# Patient Record
Sex: MALE | Race: WHITE | NOT HISPANIC OR LATINO | Employment: STUDENT | URBAN - METROPOLITAN AREA
[De-identification: names, ages, dates, MRNs, and addresses within clinical notes are randomized per-mention and may not be internally consistent; named-entity substitution may affect disease eponyms.]

---
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
## 2021-01-26 NOTE — PROGRESS NOTES
[facility] - PRIMARY CARE SKIN    CC : Oral Isotretinoin Initiation for Recalcitrant Acne  SUBJECTIVE:                                                    Shaun Moise is a 21 year old male who presents to clinic today for follow up of oral isotretinoin      Previous treatments  :  course of oral isotretinoin in 2017 ( this was effective )  , various topicals, oral antibiotics.    Month completed : 3  Dose of oral isotretinoin :  40 mg bid  Side effects : drynerss  Response to treatment : less breakouts on the face    iPLDEGE #: [id number]    Occupation :student    Refer to electronic medical record (EMR) for past medical history and medications.  ROS : 14 point review of systems was negative except the symptoms listed above in the HPI.    OBJECTIVE:                                                      GENERAL: healthy, alert and no distress  SKIN: Dixon Skin Type - II.  Face and Trunk were examined. The dermatoscope was used to help evaluate pigmented lesions.  Skin Pertinent Findings:    Face: no inflammatory papules   Chest: clear    Back: no inflammatory papules                           Hands : dry sin  Diagnostic Test Results:  No results found for this or any previous visit (from the past 24 hour(s)).    MDM : . Side effects of oral isotretinoin reviewed : dryness of the skin and mucous membranes, arthralgias, myalgias, mood changes. Discussed potential flare of the acne.      ASSESSMENT:                                                      Encounter Diagnoses   Name Primary?     Acne vulgaris      Long term use of isotretinoin      Comment : severe acne, recalcitrant to previous treatments. Oral isotretinoin to be started with monthly monitoring.        PLAN:                                                    Patient Instructions   Isotretinoin 40 mg orally two times per day  No lab work this month  Lab work in 4 weeks  Start regular moisturizer for the top of the hands, if not improving then I  will prescribe triamcinolone 0.1% cream       Literature provided: iPledge Program materials. The patient was thoroughly counseled about side effects, benefits, risks of isotretinoin and registered on the iPledge Program website.     Initial lab studies were ordered.    Oral isotretinoin is discussed fully with the patient .    It is a very effective drug to treat acne vulgaris but has many significant side effects. Chief among these are teratogenesis, hepatic injury, dyslipidemia and severe drying of the mucous membranes. All of these issues have been discussed in detail. Monthly blood tests to monitor lipids and liver functions will be necessary. Expect painful dryness and/or fissuring around the lips, eyes, and other moist areas of the body. Balms may be protective. Contact lenses may be too painful to wear temporarily while on this drug. Episodes of significant depression have been reported, including suicidal ideation and attempts in rare cases. It may also cause pseudotumor cerebri and hyperostosis. The patient will report any such changes in mood, depressive symptoms or suicidal thoughts, headaches, joint or bone pains.    Female patients MUST use two simultaneous methods of family planning. Accutane is Category X for pregnancy, meaning it will cause fetal teratogenic malformations, and pregnancy MUST be avoided while on this drug. For that reason, the patient is admonished to never share the medication.    The dose is 0.5-1 mg/kg in two divided doses for 15-20 weeks.    After discussion of these important issues, he indicates complete understanding of all of the above, and Does wish to proceed with accutane therapy. A signed consent was obtained. Discussed the importance of sticking with the program, not picking or excoriating.        Oral isotretinoin dosing:  Month #1 (prescribed on 11/30/2020) : 20 mg po bid = 1200 mg.  Month #2 (prescribed on 12/30/2020) : 40 mg po bid = 2400 mg.  Month #3 : 40 mg po bid  = 2400 mg.  Total dose taken : 5400 mg  Goal dose       PROCEDURES:                                                    None.    TT : 20 minutes.  CT : 15 minutes, with 50% of time spent reviewing lab work and counseling patient about side effects, benefits and risks of oral isotretinoin.

## 2021-01-27 ENCOUNTER — OFFICE VISIT (OUTPATIENT)
Dept: FAMILY MEDICINE | Facility: CLINIC | Age: 21
End: 2021-01-27
Payer: OTHER GOVERNMENT

## 2021-01-27 VITALS — DIASTOLIC BLOOD PRESSURE: 62 MMHG | SYSTOLIC BLOOD PRESSURE: 122 MMHG

## 2021-01-27 DIAGNOSIS — Z79.899 LONG TERM USE OF ISOTRETINOIN: ICD-10-CM

## 2021-01-27 DIAGNOSIS — L70.0 ACNE VULGARIS: ICD-10-CM

## 2021-01-27 PROCEDURE — 99213 OFFICE O/P EST LOW 20 MIN: CPT | Performed by: FAMILY MEDICINE

## 2021-01-27 RX ORDER — ISOTRETINOIN 40 MG/1
40 CAPSULE ORAL 2 TIMES DAILY
Qty: 60 CAPSULE | Refills: 0 | Status: SHIPPED | OUTPATIENT
Start: 2021-01-27 | End: 2021-02-24

## 2021-01-27 NOTE — LETTER
1/27/2021         RE: Shaun Moise  [address]  [place] [address]        Dear Colleague,    Thank you for referring your patient, Shaun Moise, to the [facility] [place]. Please see a copy of my visit note below.    [facility] - PRIMARY CARE SKIN    CC : Oral Isotretinoin Initiation for Recalcitrant Acne  SUBJECTIVE:                                                    Shaun Moise is a 21 year old male who presents to clinic today for follow up of oral isotretinoin      Previous treatments  :  course of oral isotretinoin in 2017 ( this was effective )  , various topicals, oral antibiotics.    Month completed : 3  Dose of oral isotretinoin :  40 mg bid  Side effects : drynerss  Response to treatment : less breakouts on the face    [employer] #: [id number]    Occupation :student    Refer to electronic medical record (EMR) for past medical history and medications.  ROS : 14 point review of systems was negative except the symptoms listed above in the HPI.    OBJECTIVE:                                                      GENERAL: healthy, alert and no distress  SKIN: Dixon Skin Type - II.  Face and Trunk were examined. The dermatoscope was used to help evaluate pigmented lesions.  Skin Pertinent Findings:    Face: no inflammatory papules   Chest: clear    Back: no inflammatory papules                           Hands : dry sin  Diagnostic Test Results:  No results found for this or any previous visit (from the past 24 hour(s)).    MDM : . Side effects of oral isotretinoin reviewed : dryness of the skin and mucous membranes, arthralgias, myalgias, mood changes. Discussed potential flare of the acne.      ASSESSMENT:                                                      Encounter Diagnoses   Name Primary?     Acne vulgaris      Long term use of isotretinoin      Comment : severe acne, recalcitrant to previous treatments. Oral isotretinoin to be started with monthly  monitoring.        PLAN:                                                    Patient Instructions   Isotretinoin 40 mg orally two times per day  No lab work this month  Lab work in 4 weeks  Start regular moisturizer for the top of the hands, if not improving then I will prescribe triamcinolone 0.1% cream       Literature provided: iPledge Program materials. The patient was thoroughly counseled about side effects, benefits, risks of isotretinoin and registered on the iPledge Program website.     Initial lab studies were ordered.    Oral isotretinoin is discussed fully with the patient .    It is a very effective drug to treat acne vulgaris but has many significant side effects. Chief among these are teratogenesis, hepatic injury, dyslipidemia and severe drying of the mucous membranes. All of these issues have been discussed in detail. Monthly blood tests to monitor lipids and liver functions will be necessary. Expect painful dryness and/or fissuring around the lips, eyes, and other moist areas of the body. Balms may be protective. Contact lenses may be too painful to wear temporarily while on this drug. Episodes of significant depression have been reported, including suicidal ideation and attempts in rare cases. It may also cause pseudotumor cerebri and hyperostosis. The patient will report any such changes in mood, depressive symptoms or suicidal thoughts, headaches, joint or bone pains.    Female patients MUST use two simultaneous methods of family planning. Accutane is Category X for pregnancy, meaning it will cause fetal teratogenic malformations, and pregnancy MUST be avoided while on this drug. For that reason, the patient is admonished to never share the medication.    The dose is 0.5-1 mg/kg in two divided doses for 15-20 weeks.    After discussion of these important issues, he indicates complete understanding of all of the above, and Does wish to proceed with accutane therapy. A signed consent was obtained.  Discussed the importance of sticking with the program, not picking or excoriating.        Oral isotretinoin dosing:  Month #1 (prescribed on 11/30/2020) : 20 mg po bid = 1200 mg.  Month #2 (prescribed on 12/30/2020) : 40 mg po bid = 2400 mg.  Month #3 : 40 mg po bid = 2400 mg.  Total dose taken : 5400 mg  Goal dose       PROCEDURES:                                                    None.    TT : 20 minutes.  CT : 15 minutes, with 50% of time spent reviewing lab work and counseling patient about side effects, benefits and risks of oral isotretinoin.          Again, thank you for allowing me to participate in the care of your patient.        Sincerely,        Yasmine Hodges MD

## 2021-02-23 NOTE — PROGRESS NOTES
[facility] - PRIMARY CARE SKIN    CC : Oral Isotretinoin Initiation for Recalcitrant Acne  SUBJECTIVE:                                                    Shaun Moise is a 21 year old male who presents to clinic today for follow up of oral isotretinoin      Previous treatments  :  course of oral isotretinoin in 2017 ( this was effective )  , various topicals, oral antibiotics.    Month completed : 4  Dose of oral isotretinoin :  40 mg bid  Side effects : drynerss  Response to treatment : no lesions on face or back    YelloYello #: [id number]    Occupation :student    Refer to electronic medical record (EMR) for past medical history and medications.  ROS : 14 point review of systems was negative except the symptoms listed above in the HPI.    OBJECTIVE:                                                      GENERAL: healthy, alert and no distress  SKIN: Dixon Skin Type - II.  Face and Trunk were examined. The dermatoscope was used to help evaluate pigmented lesions.  Skin Pertinent Findings:    Face: no inflammatory papules   Chest: clear    Back: no inflammatory papules                           Hands : dry sin  Diagnostic Test Results:  No results found for this or any previous visit (from the past 24 hour(s)).    MDM : . Side effects of oral isotretinoin reviewed : dryness of the skin and mucous membranes, arthralgias, myalgias, mood changes. Discussed potential flare of the acne.      ASSESSMENT:                                                      Encounter Diagnoses   Name Primary?     Acne vulgaris Yes     Long term use of isotretinoin      MDM : continue another month even tho no acne lesions, because not in the goal dosage range this month        PLAN:                                                    Patient Instructions   Isotretinoin 40 mg one orally two times per day  Recheck in 4 weeks      Literature provided: iPledge Program materials. The patient was thoroughly counseled about side effects,  benefits, risks of isotretinoin and registered on the iPledge Program website.     Initial lab studies were ordered.    Oral isotretinoin is discussed fully with the patient .    It is a very effective drug to treat acne vulgaris but has many significant side effects. Chief among these are teratogenesis, hepatic injury, dyslipidemia and severe drying of the mucous membranes. All of these issues have been discussed in detail. Monthly blood tests to monitor lipids and liver functions will be necessary. Expect painful dryness and/or fissuring around the lips, eyes, and other moist areas of the body. Balms may be protective. Contact lenses may be too painful to wear temporarily while on this drug. Episodes of significant depression have been reported, including suicidal ideation and attempts in rare cases. It may also cause pseudotumor cerebri and hyperostosis. The patient will report any such changes in mood, depressive symptoms or suicidal thoughts, headaches, joint or bone pains.    Female patients MUST use two simultaneous methods of family planning. Accutane is Category X for pregnancy, meaning it will cause fetal teratogenic malformations, and pregnancy MUST be avoided while on this drug. For that reason, the patient is admonished to never share the medication.    The dose is 0.5-1 mg/kg in two divided doses for 15-20 weeks.    After discussion of these important issues, he indicates complete understanding of all of the above, and Does wish to proceed with accutane therapy. A signed consent was obtained. Discussed the importance of sticking with the program, not picking or excoriating.        Oral isotretinoin dosing:  Month #1 (prescribed on 11/30/2020) : 20 mg po bid = 1200 mg.  Month #2 (prescribed on 12/30/2020) : 40 mg po bid = 2400 mg.  Month #3 : 40 mg po bid = 2400 mg.  Month #4 : 40 mg po bid = 2400 mg.  Total dose taken : 7800 mg  Goal dose 9750 mg-[id number]      PROCEDURES:                                                     None.

## 2021-02-24 ENCOUNTER — OFFICE VISIT (OUTPATIENT)
Dept: FAMILY MEDICINE | Facility: CLINIC | Age: 21
End: 2021-02-24
Payer: OTHER GOVERNMENT

## 2021-02-24 VITALS — SYSTOLIC BLOOD PRESSURE: 112 MMHG | WEIGHT: 145 LBS | DIASTOLIC BLOOD PRESSURE: 76 MMHG

## 2021-02-24 DIAGNOSIS — L70.0 ACNE VULGARIS: Primary | ICD-10-CM

## 2021-02-24 DIAGNOSIS — Z79.899 LONG TERM USE OF ISOTRETINOIN: ICD-10-CM

## 2021-02-24 LAB
BASOPHILS # BLD AUTO: 0 10E9/L (ref 0–0.2)
BASOPHILS NFR BLD AUTO: 0.9 %
DIFFERENTIAL METHOD BLD: NORMAL
EOSINOPHIL # BLD AUTO: 0.1 10E9/L (ref 0–0.7)
EOSINOPHIL NFR BLD AUTO: 2.6 %
ERYTHROCYTE [DISTWIDTH] IN BLOOD BY AUTOMATED COUNT: 12.7 % (ref 10–15)
HCT VFR BLD AUTO: 45.5 % (ref 40–53)
HGB BLD-MCNC: 16 G/DL (ref 13.3–17.7)
LYMPHOCYTES # BLD AUTO: 1.3 10E9/L (ref 0.8–5.3)
LYMPHOCYTES NFR BLD AUTO: 27.8 %
MCH RBC QN AUTO: 29.3 PG (ref 26.5–33)
MCHC RBC AUTO-ENTMCNC: 35.2 G/DL (ref 31.5–36.5)
MCV RBC AUTO: 83 FL (ref 78–100)
MONOCYTES # BLD AUTO: 0.5 10E9/L (ref 0–1.3)
MONOCYTES NFR BLD AUTO: 10.8 %
NEUTROPHILS # BLD AUTO: 2.7 10E9/L (ref 1.6–8.3)
NEUTROPHILS NFR BLD AUTO: 57.9 %
PLATELET # BLD AUTO: 247 10E9/L (ref 150–450)
RBC # BLD AUTO: 5.46 10E12/L (ref 4.4–5.9)
WBC # BLD AUTO: 4.6 10E9/L (ref 4–11)

## 2021-02-24 PROCEDURE — 85025 COMPLETE CBC W/AUTO DIFF WBC: CPT | Performed by: FAMILY MEDICINE

## 2021-02-24 PROCEDURE — 99213 OFFICE O/P EST LOW 20 MIN: CPT | Performed by: FAMILY MEDICINE

## 2021-02-24 PROCEDURE — 36415 COLL VENOUS BLD VENIPUNCTURE: CPT | Performed by: FAMILY MEDICINE

## 2021-02-24 PROCEDURE — 80061 LIPID PANEL: CPT | Performed by: FAMILY MEDICINE

## 2021-02-24 PROCEDURE — 84460 ALANINE AMINO (ALT) (SGPT): CPT | Performed by: FAMILY MEDICINE

## 2021-02-24 RX ORDER — ISOTRETINOIN 40 MG/1
40 CAPSULE ORAL 2 TIMES DAILY
Qty: 60 CAPSULE | Refills: 0 | Status: SHIPPED | OUTPATIENT
Start: 2021-02-24

## 2021-02-24 NOTE — Clinical Note
2/24/2021         RE: Shaun Moise  [address]  [place] [address]        Dear Colleague,    Thank you for referring your patient, Shaun Moise, to the [facility]. Please see a copy of my visit note below.    [facility] - PRIMARY CARE SKIN    CC : Oral Isotretinoin Initiation for Recalcitrant Acne  SUBJECTIVE:                                                    Shaun Moise is a 21 year old male who presents to clinic today for follow up of oral isotretinoin      Previous treatments  :  course of oral isotretinoin in 2017 ( this was effective )  , various topicals, oral antibiotics.    Month completed : 4  Dose of oral isotretinoin :  40 mg bid  Side effects : drynerss  Response to treatment : no lesions on face or back    iPLDEGE #: [id number]    Occupation :student    Refer to electronic medical record (EMR) for past medical history and medications.  ROS : 14 point review of systems was negative except the symptoms listed above in the HPI.    OBJECTIVE:                                                      GENERAL: healthy, alert and no distress  SKIN: Dixon Skin Type - II.  Face and Trunk were examined. The dermatoscope was used to help evaluate pigmented lesions.  Skin Pertinent Findings:    Face: no inflammatory papules   Chest: clear    Back: no inflammatory papules                           Hands : dry sin  Diagnostic Test Results:  No results found for this or any previous visit (from the past 24 hour(s)).    MDM : . Side effects of oral isotretinoin reviewed : dryness of the skin and mucous membranes, arthralgias, myalgias, mood changes. Discussed potential flare of the acne.      ASSESSMENT:                                                      Encounter Diagnoses   Name Primary?     Acne vulgaris Yes     Long term use of isotretinoin      MDM : continue another month even tho no acne lesions, because not in the goal dosage range this  month        PLAN:                                                    Patient Instructions   Isotretinoin 40 mg one orally two times per day  Recheck in 4 weeks      Literature provided: iPledge Program materials. The patient was thoroughly counseled about side effects, benefits, risks of isotretinoin and registered on the iPledge Program website.     Initial lab studies were ordered.    Oral isotretinoin is discussed fully with the patient .    It is a very effective drug to treat acne vulgaris but has many significant side effects. Chief among these are teratogenesis, hepatic injury, dyslipidemia and severe drying of the mucous membranes. All of these issues have been discussed in detail. Monthly blood tests to monitor lipids and liver functions will be necessary. Expect painful dryness and/or fissuring around the lips, eyes, and other moist areas of the body. Balms may be protective. Contact lenses may be too painful to wear temporarily while on this drug. Episodes of significant depression have been reported, including suicidal ideation and attempts in rare cases. It may also cause pseudotumor cerebri and hyperostosis. The patient will report any such changes in mood, depressive symptoms or suicidal thoughts, headaches, joint or bone pains.    Female patients MUST use two simultaneous methods of family planning. Accutane is Category X for pregnancy, meaning it will cause fetal teratogenic malformations, and pregnancy MUST be avoided while on this drug. For that reason, the patient is admonished to never share the medication.    The dose is 0.5-1 mg/kg in two divided doses for 15-20 weeks.    After discussion of these important issues, he indicates complete understanding of all of the above, and Does wish to proceed with accutane therapy. A signed consent was obtained. Discussed the importance of sticking with the program, not picking or excoriating.        Oral isotretinoin dosing:  Month #1 (prescribed on  11/30/2020) : 20 mg po bid = 1200 mg.  Month #2 (prescribed on 12/30/2020) : 40 mg po bid = 2400 mg.  Month #3 : 40 mg po bid = 2400 mg.  Month #4 : 40 mg po bid = 2400 mg.  Total dose taken : 7800 mg  Goal dose 9750 mg-68846      PROCEDURES:                                                    None.            Again, thank you for allowing me to participate in the care of your patient.        Sincerely,        Yasmine Hodges MD

## 2021-02-25 LAB
ALT SERPL W P-5'-P-CCNC: 29 U/L (ref 0–70)
CHOLEST SERPL-MCNC: 206 MG/DL
HDLC SERPL-MCNC: 43 MG/DL
LDLC SERPL CALC-MCNC: 123 MG/DL
NONHDLC SERPL-MCNC: 163 MG/DL
TRIGL SERPL-MCNC: 198 MG/DL

## 2021-02-26 ENCOUNTER — TELEPHONE (OUTPATIENT)
Dept: FAMILY MEDICINE | Facility: CLINIC | Age: 21
End: 2021-02-26

## 2021-02-26 NOTE — TELEPHONE ENCOUNTER
Patient notified of test results and providers message, patient has no further questions.    Jada HOOPERRN BSN  [facility]  [phone]

## 2021-02-26 NOTE — TELEPHONE ENCOUNTER
----- Message from Yasmine Hodges MD sent at 2/26/2021  9:36 AM CST -----  Please call,         Triglyceride level is a little more elevated but I suspect that is related to non fasting state. Will draw lab work next month but in a fasting state, 10-12 hours no food , water and black coffee is fine.     Thank you,  Yasmine Hodges M.D.

## 2021-03-23 NOTE — PROGRESS NOTES
[facility] - PRIMARY CARE SKIN    CC : Oral Isotretinoin Initiation for Recalcitrant Acne  SUBJECTIVE:                                                    Shaun Moise is a 21 year old male who presents to clinic today for follow up of oral isotretinoin      Previous treatments  :  course of oral isotretinoin in 2017 ( this was effective )  , various topicals, oral antibiotics.    Month completed : 5  Dose of oral isotretinoin :  40 mg bid  Side effects : drynerss  Response to treatment : no lesions on face or back    iPLDEGE #: [id number]    Occupation :student    Refer to electronic medical record (EMR) for past medical history and medications.  ROS : 14 point review of systems was negative except the symptoms listed above in the HPI.    OBJECTIVE:                                                      GENERAL: healthy, alert and no distress  SKIN: Dixon Skin Type - II.  Face and Trunk were examined. The dermatoscope was used to help evaluate pigmented lesions.  Skin Pertinent Findings:    Face: no inflammatory papules   Chest: clear    Back: no inflammatory papules                           Hands : dry sin  Diagnostic Test Results:  No results found for this or any previous visit (from the past 24 hour(s)).          ASSESSMENT:                                                      Encounter Diagnosis   Name Primary?     Acne vulgaris Yes       PLAN:                                                    Patient Instructions   Complete one more fasting lipid profile  Stop the oral isotretinoin     CURRENT DOSAGE: Finish remaining amount of oral isotretinoin, following last month's instructions.      Only use Cetaphil or CeraVe facial cleansers and moisturizer from here on out.    Keep iPledge ID# and PASSWORD in case it is needed in the future.    Do not donate blood for at least 1 month after taking your last dose of oral isotretinoin.        L    Oral isotretinoin dosing:  Month #1 (prescribed on 11/30/2020)  : 20 mg po bid = 1200 mg.  Month #2 (prescribed on 12/30/2020) : 40 mg po bid = 2400 mg.  Month #3 : 40 mg po bid = 2400 mg.  Month #4 : 40 mg po bid = 2400 mg.  Month #5 : 40 mg po bid = 2400 mg.  Total dose taken : 38609 mg  Goal dose 9750 mg-68217      PROCEDURES:                                                    None.

## 2021-03-24 ENCOUNTER — OFFICE VISIT (OUTPATIENT)
Dept: FAMILY MEDICINE | Facility: CLINIC | Age: 21
End: 2021-03-24
Payer: OTHER GOVERNMENT

## 2021-03-24 VITALS — SYSTOLIC BLOOD PRESSURE: 108 MMHG | DIASTOLIC BLOOD PRESSURE: 70 MMHG

## 2021-03-24 DIAGNOSIS — L70.0 ACNE VULGARIS: Primary | ICD-10-CM

## 2021-03-24 PROCEDURE — 99213 OFFICE O/P EST LOW 20 MIN: CPT | Performed by: FAMILY MEDICINE

## 2021-03-24 NOTE — Clinical Note
3/24/2021         RE: Shaun Moise  [address]  [place] [address]        Dear Colleague,    Thank you for referring your patient, Shaun Moise, to the [facility] [place]. Please see a copy of my visit note below.    [facility] - PRIMARY CARE SKIN    CC : Oral Isotretinoin Initiation for Recalcitrant Acne  SUBJECTIVE:                                                    Shaun Moise is a 21 year old male who presents to clinic today for follow up of oral isotretinoin      Previous treatments  :  course of oral isotretinoin in 2017 ( this was effective )  , various topicals, oral antibiotics.    Month completed : 5  Dose of oral isotretinoin :  40 mg bid  Side effects : drynerss  Response to treatment : no lesions on face or back    iPLDEGE #: [id number]    Occupation :student    Refer to electronic medical record (EMR) for past medical history and medications.  ROS : 14 point review of systems was negative except the symptoms listed above in the HPI.    OBJECTIVE:                                                      GENERAL: healthy, alert and no distress  SKIN: Dixon Skin Type - II.  Face and Trunk were examined. The dermatoscope was used to help evaluate pigmented lesions.  Skin Pertinent Findings:    Face: no inflammatory papules   Chest: clear    Back: no inflammatory papules                           Hands : dry sin  Diagnostic Test Results:  No results found for this or any previous visit (from the past 24 hour(s)).          ASSESSMENT:                                                      Encounter Diagnosis   Name Primary?     Acne vulgaris Yes       PLAN:                                                    Patient Instructions   Complete one more fasting lipid profile  Stop the oral isotretinoin     CURRENT DOSAGE: Finish remaining amount of oral isotretinoin, following last month's instructions.      Only use Cetaphil or CeraVe facial cleansers and moisturizer  from here on out.    Keep iPledge ID# and PASSWORD in case it is needed in the future.    Do not donate blood for at least 1 month after taking your last dose of oral isotretinoin.        L    Oral isotretinoin dosing:  Month #1 (prescribed on 11/30/2020) : 20 mg po bid = 1200 mg.  Month #2 (prescribed on 12/30/2020) : 40 mg po bid = 2400 mg.  Month #3 : 40 mg po bid = 2400 mg.  Month #4 : 40 mg po bid = 2400 mg.  Month #5 : 40 mg po bid = 2400 mg.  Total dose taken : 82469 mg  Goal dose 9750 mg-85525      PROCEDURES:                                                    None.          Again, thank you for allowing me to participate in the care of your patient.        Sincerely,        Yasmine Hodges MD

## 2021-03-24 NOTE — PATIENT INSTRUCTIONS
Complete one more fasting lipid profile  Stop the oral isotretinoin   SUNSCREEN USE IS IMPORTANT  CURRENT DOSAGE: Finish remaining amount of oral isotretinoin, following last month's instructions.      Only use Cetaphil or CeraVe facial cleansers and moisturizer from here on out.    Keep iPledge ID# and PASSWORD in case it is needed in the future.    Do not donate blood for at least 1 month after taking your last dose of oral isotretinoin.

## 2021-12-22 ENCOUNTER — APPOINTMENT (OUTPATIENT)
Dept: GENERAL RADIOLOGY | Facility: CLINIC | Age: 21
End: 2021-12-22
Attending: EMERGENCY MEDICINE
Payer: OTHER GOVERNMENT

## 2021-12-22 ENCOUNTER — HOSPITAL ENCOUNTER (EMERGENCY)
Facility: CLINIC | Age: 21
Discharge: HOME | End: 2021-12-22
Attending: EMERGENCY MEDICINE | Admitting: EMERGENCY MEDICINE
Payer: OTHER GOVERNMENT

## 2021-12-22 VITALS
HEART RATE: 85 BPM | TEMPERATURE: 98.6 F | SYSTOLIC BLOOD PRESSURE: 140 MMHG | WEIGHT: 148.59 LBS | RESPIRATION RATE: 13 BRPM | DIASTOLIC BLOOD PRESSURE: 90 MMHG | HEIGHT: 73 IN | OXYGEN SATURATION: 94 % | BODY MASS INDEX: 19.69 KG/M2

## 2021-12-22 DIAGNOSIS — R00.2 PALPITATIONS: ICD-10-CM

## 2021-12-22 DIAGNOSIS — R07.9 ACUTE CHEST PAIN: ICD-10-CM

## 2021-12-22 LAB
ANION GAP SERPL CALCULATED.3IONS-SCNC: 4 MMOL/L (ref 3–14)
ATRIAL RATE - MUSE: 97 BPM
BASOPHILS # BLD AUTO: 0.1 10E3/UL (ref 0–0.2)
BASOPHILS NFR BLD AUTO: 1 %
BUN SERPL-MCNC: 16 MG/DL (ref 7–30)
CALCIUM SERPL-MCNC: 8.3 MG/DL (ref 8.5–10.1)
CHLORIDE BLD-SCNC: 107 MMOL/L (ref 94–109)
CO2 SERPL-SCNC: 28 MMOL/L (ref 20–32)
CREAT SERPL-MCNC: 1.1 MG/DL (ref 0.66–1.25)
D DIMER PPP FEU-MCNC: <0.27 UG/ML FEU (ref 0–0.5)
DIASTOLIC BLOOD PRESSURE - MUSE: NORMAL MMHG
EOSINOPHIL # BLD AUTO: 0.1 10E3/UL (ref 0–0.7)
EOSINOPHIL NFR BLD AUTO: 2 %
ERYTHROCYTE [DISTWIDTH] IN BLOOD BY AUTOMATED COUNT: 12 % (ref 10–15)
GFR SERPL CREATININE-BSD FRML MDRD: >90 ML/MIN/1.73M2
GLUCOSE BLD-MCNC: 96 MG/DL (ref 70–99)
HCT VFR BLD AUTO: 44 % (ref 40–53)
HGB BLD-MCNC: 14.6 G/DL (ref 13.3–17.7)
IMM GRANULOCYTES # BLD: 0 10E3/UL
IMM GRANULOCYTES NFR BLD: 0 %
INTERPRETATION ECG - MUSE: NORMAL
LYMPHOCYTES # BLD AUTO: 2.1 10E3/UL (ref 0.8–5.3)
LYMPHOCYTES NFR BLD AUTO: 28 %
MCH RBC QN AUTO: 28.9 PG (ref 26.5–33)
MCHC RBC AUTO-ENTMCNC: 33.2 G/DL (ref 31.5–36.5)
MCV RBC AUTO: 87 FL (ref 78–100)
MONOCYTES # BLD AUTO: 0.6 10E3/UL (ref 0–1.3)
MONOCYTES NFR BLD AUTO: 8 %
NEUTROPHILS # BLD AUTO: 4.6 10E3/UL (ref 1.6–8.3)
NEUTROPHILS NFR BLD AUTO: 61 %
NRBC # BLD AUTO: 0 10E3/UL
NRBC BLD AUTO-RTO: 0 /100
P AXIS - MUSE: 79 DEGREES
PLATELET # BLD AUTO: 223 10E3/UL (ref 150–450)
POTASSIUM BLD-SCNC: 4 MMOL/L (ref 3.4–5.3)
PR INTERVAL - MUSE: 118 MS
QRS DURATION - MUSE: 92 MS
QT - MUSE: 310 MS
QTC - MUSE: 393 MS
R AXIS - MUSE: 84 DEGREES
RBC # BLD AUTO: 5.06 10E6/UL (ref 4.4–5.9)
SODIUM SERPL-SCNC: 139 MMOL/L (ref 133–144)
SYSTOLIC BLOOD PRESSURE - MUSE: NORMAL MMHG
T AXIS - MUSE: 57 DEGREES
TROPONIN I SERPL HS-MCNC: 4 NG/L
VENTRICULAR RATE- MUSE: 97 BPM
WBC # BLD AUTO: 7.5 10E3/UL (ref 4–11)

## 2021-12-22 PROCEDURE — 93005 ELECTROCARDIOGRAM TRACING: CPT

## 2021-12-22 PROCEDURE — 80048 BASIC METABOLIC PNL TOTAL CA: CPT | Performed by: EMERGENCY MEDICINE

## 2021-12-22 PROCEDURE — 71046 X-RAY EXAM CHEST 2 VIEWS: CPT

## 2021-12-22 PROCEDURE — 84484 ASSAY OF TROPONIN QUANT: CPT | Performed by: EMERGENCY MEDICINE

## 2021-12-22 PROCEDURE — 36415 COLL VENOUS BLD VENIPUNCTURE: CPT | Performed by: EMERGENCY MEDICINE

## 2021-12-22 PROCEDURE — 85379 FIBRIN DEGRADATION QUANT: CPT | Performed by: EMERGENCY MEDICINE

## 2021-12-22 PROCEDURE — 99285 EMERGENCY DEPT VISIT HI MDM: CPT | Mod: 25

## 2021-12-22 PROCEDURE — 85025 COMPLETE CBC W/AUTO DIFF WBC: CPT | Performed by: EMERGENCY MEDICINE

## 2021-12-22 ASSESSMENT — ENCOUNTER SYMPTOMS
PALPITATIONS: 1
CHEST TIGHTNESS: 1
SHORTNESS OF BREATH: 1
COUGH: 0
DIARRHEA: 0
CHILLS: 0
VOMITING: 0
ABDOMINAL PAIN: 0
FEVER: 0

## 2021-12-22 ASSESSMENT — MIFFLIN-ST. JEOR: SCORE: 1737.88

## 2021-12-22 NOTE — LETTER
December 22, 2021      To Whom It May Concern:      Shaun Moise was seen in our Emergency Department today, 12/22/21. Please excuse him from work on 12/23/21.  He may return to work on 12/24/21.    Sincerely,        Elizabeth Roman MD

## 2021-12-23 NOTE — ED TRIAGE NOTES
1815 L CP radiating to L shoulder and making it difficult to take a deep breath.  Pt was sitting in a chair at the time.      Covid test done at 7p negative.

## 2021-12-23 NOTE — ED PROVIDER NOTES
History   Chief Complaint:  Chest Pain and Shortness of Breath       The history is provided by the patient and a parent.      Shaun Moise is a 21 year old otherwise healthy male who presents with chest pain and shortness of breath. The patient reports starting 4 hours ago he began feel pain in his chest described as tightness along with shortness of breath and intermittent palpitations. The pain is present in the middle and left side of his chest and rates at a 3-4/10. The patient explains similar symptoms occurred when he was 9 years old and at that time he was seen by a provider who stated he had an irregular heart rate. After that first episode as a child he has had no problems until about 1 year ago. Over the past year the patient has had intermittent episodes of a racing heart beat and achy chest pain which last about 1 minute. He did have a ziopatch placed this summer for about 2-3 days which showed normal results. Upon arrival to the ED today he notes the shortness of breath he is experiencing is different from his previous episodes. The patient did take an at home covid test today which was negative. He denies any cough, fever, chills, vomiting, diarrhea, or abdominal pain. His father has a history of atrial fibrillation at a young age.     Review of Systems   Constitutional: Negative for chills and fever.   Respiratory: Positive for chest tightness and shortness of breath. Negative for cough.    Cardiovascular: Positive for chest pain and palpitations.   Gastrointestinal: Negative for abdominal pain, diarrhea and vomiting.   All other systems reviewed and are negative.    Allergies:  No Known Allergies    Medications:  Isotretinoin     Past Medical History:     isotretinoin therapy      Family History:    Atrial fibrillation     Social History:  Presents with mother.   [facility]P: [facility]     Physical Exam     Patient Vitals for the past 24 hrs:   BP Temp Temp src Pulse Resp SpO2 Height Weight  "  12/22/21 2215 (!) 140/90 -- -- 85 13 94 % -- --   12/22/21 2129 (!) 156/76 98.6  F (37  C) Oral 96 16 98 % 1.854 m (6' 1\") 67.4 kg (148 lb 9.4 oz)       Physical Exam  Nursing note and vitals reviewed.  Constitutional:  Appears well-developed and well-nourished.   HENT:   Head:    Atraumatic.   Mouth/Throat:   Oropharynx is clear and moist. No oropharyngeal exudate.   Eyes:    Pupils are equal, round, and reactive to light.   Neck:    Normal range of motion. Neck supple.      No tracheal deviation present. No thyromegaly present.   Cardiovascular:  Normal rate, regular rhythm, no murmur   Pulmonary/Chest: Breath sounds are clear and equal without wheezes or crackles. Non tender chest wall.   Abdominal:   Soft. Bowel sounds are normal. Exhibits no distension and      no mass. There is no tenderness.      There is no rebound and no guarding.   Musculoskeletal:  Exhibits no edema.   Lymphadenopathy:  No cervical adenopathy.   Neurological:   Alert and oriented to person, place, and time.   Skin:    Skin is warm and dry. No rash noted. No pallor.     Emergency Department Course   ECG  ECG obtained at 2129, ECG read at 2200  Normal sinus rhythm. Right atrial enlargement. Septal infarct, age undetermined. Abnormal ECG.     No prior ECG to compare.   Rate 97 bpm. FL interval 118 ms. QRS duration 92 ms. QT/QTc 310/393 ms. P-R-T axes 79 84 57.     Imaging:  XR Chest 2 Views   Final Result   IMPRESSION: Cardiomediastinal silhouette within normal limits. No focal consolidation or pleural effusion. No visible pneumothorax.        Report per radiology    Laboratory:  Labs Ordered and Resulted from Time of ED Arrival to Time of ED Departure   BASIC METABOLIC PANEL - Abnormal       Result Value    Sodium 139      Potassium 4.0      Chloride 107      Carbon Dioxide (CO2) 28      Anion Gap 4      Urea Nitrogen 16      Creatinine 1.10      Calcium 8.3 (*)     Glucose 96      GFR Estimate >90     TROPONIN I - Normal    Troponin I " High Sensitivity 4     D DIMER QUANTITATIVE - Normal    D-Dimer Quantitative <0.27     CBC WITH PLATELETS AND DIFFERENTIAL    WBC Count 7.5      RBC Count 5.06      Hemoglobin 14.6      Hematocrit 44.0      MCV 87      MCH 28.9      MCHC 33.2      RDW 12.0      Platelet Count 223      % Neutrophils 61      % Lymphocytes 28      % Monocytes 8      % Eosinophils 2      % Basophils 1      % Immature Granulocytes 0      NRBCs per 100 WBC 0      Absolute Neutrophils 4.6      Absolute Lymphocytes 2.1      Absolute Monocytes 0.6      Absolute Eosinophils 0.1      Absolute Basophils 0.1      Absolute Immature Granulocytes 0.0      Absolute NRBCs 0.0         Emergency Department Course:    Reviewed:  I reviewed nursing notes, vitals, past medical history and Care Everywhere    Assessments:  2209 I obtained history and examined the patient as noted above.   2329 I rechecked the patient and explained findings.     Disposition:  The patient was discharged to home.     Impression & Plan     Medical Decision Making:  I found this patient to have chest pain of unclear etiology with palpitations. It could be that the patient is having intermittent arrhythmia however he was in normal sinus rhythm here without any acute ischemic changes. He was monitored on the cardiac monitor without any rhythm disturbance. Their was no sign pulmonary embolism and he is PERC negative. He has a negative D dimer. He is not tachycardic or hypoxic here. Chest Xray does not show any sign of pneumothorax, pneumonia, or pleural effusion. Their is no sign of acute myocardial infarction here on his EKG and his troponin is negative. I did not have any suspicion for aortic dissection either. I considered the possibility of chest wall pain so he was told to take ibuprofen for the pain with food and return if symptoms worsen. He was instructed to follow up in his regular clinic to discuss having another zio patch monitor placed. He was told signs and symptoms to  return for.     Diagnosis:    ICD-10-CM    1. Acute chest pain  R07.9    2. Palpitations  R00.2        Scribe Disclosure:  I, Liya José Miguel, am serving as a scribe at 10:09 PM on 12/22/2021 to document services personally performed by Elizabeth Roman MD based on my observations and the provider's statements to me.            Elizabeth Roman MD  12/23/21 0423

## 2021-12-23 NOTE — ED NOTES
"When patient was 8yo, mom reports he had irregular heart rate at a well child visit and was dx jaimee alves white. Has had HR in 140s intermittently.  Has had pain in left side of chest that radiates to (L) scapula that began at 17:00. Reports he has SOB and feels he \"cannot get air into there or something.\" Has family hx of A-fib. Denies N/V/D. Denies fevers. Denies diaphoresis. Denies cough/cold symptoms. Took Covid-19 home test this evening that was negative.   "

## 2022-01-15 ENCOUNTER — HEALTH MAINTENANCE LETTER (OUTPATIENT)
Age: 22
End: 2022-01-15

## 2023-04-22 ENCOUNTER — HEALTH MAINTENANCE LETTER (OUTPATIENT)
Age: 23
End: 2023-04-22

## 2024-06-29 ENCOUNTER — HEALTH MAINTENANCE LETTER (OUTPATIENT)
Age: 24
End: 2024-06-29

## 2024-08-27 ENCOUNTER — HOSPITAL ENCOUNTER (EMERGENCY)
Facility: CLINIC | Age: 24
Discharge: HOME | End: 2024-08-28
Attending: EMERGENCY MEDICINE | Admitting: EMERGENCY MEDICINE
Payer: OTHER GOVERNMENT

## 2024-08-27 ENCOUNTER — APPOINTMENT (OUTPATIENT)
Dept: GENERAL RADIOLOGY | Facility: CLINIC | Age: 24
End: 2024-08-27
Attending: EMERGENCY MEDICINE
Payer: OTHER GOVERNMENT

## 2024-08-27 VITALS
OXYGEN SATURATION: 99 % | BODY MASS INDEX: 19.13 KG/M2 | SYSTOLIC BLOOD PRESSURE: 142 MMHG | WEIGHT: 145 LBS | RESPIRATION RATE: 12 BRPM | DIASTOLIC BLOOD PRESSURE: 85 MMHG | HEART RATE: 80 BPM | TEMPERATURE: 98.2 F

## 2024-08-27 DIAGNOSIS — R00.2 PALPITATIONS: ICD-10-CM

## 2024-08-27 DIAGNOSIS — I49.3 PVC'S (PREMATURE VENTRICULAR CONTRACTIONS): ICD-10-CM

## 2024-08-27 LAB
BASOPHILS # BLD AUTO: 0.1 10E3/UL (ref 0–0.2)
BASOPHILS NFR BLD AUTO: 1 %
EOSINOPHIL # BLD AUTO: 0.1 10E3/UL (ref 0–0.7)
EOSINOPHIL NFR BLD AUTO: 1 %
ERYTHROCYTE [DISTWIDTH] IN BLOOD BY AUTOMATED COUNT: 12.4 % (ref 10–15)
HCT VFR BLD AUTO: 42.1 % (ref 40–53)
HGB BLD-MCNC: 14.6 G/DL (ref 13.3–17.7)
IMM GRANULOCYTES # BLD: 0 10E3/UL
IMM GRANULOCYTES NFR BLD: 0 %
LYMPHOCYTES # BLD AUTO: 2.2 10E3/UL (ref 0.8–5.3)
LYMPHOCYTES NFR BLD AUTO: 36 %
MCH RBC QN AUTO: 29 PG (ref 26.5–33)
MCHC RBC AUTO-ENTMCNC: 34.7 G/DL (ref 31.5–36.5)
MCV RBC AUTO: 84 FL (ref 78–100)
MONOCYTES # BLD AUTO: 0.6 10E3/UL (ref 0–1.3)
MONOCYTES NFR BLD AUTO: 9 %
NEUTROPHILS # BLD AUTO: 3.3 10E3/UL (ref 1.6–8.3)
NEUTROPHILS NFR BLD AUTO: 53 %
NRBC # BLD AUTO: 0 10E3/UL
NRBC BLD AUTO-RTO: 0 /100
PLATELET # BLD AUTO: 230 10E3/UL (ref 150–450)
RBC # BLD AUTO: 5.03 10E6/UL (ref 4.4–5.9)
WBC # BLD AUTO: 6.2 10E3/UL (ref 4–11)

## 2024-08-27 PROCEDURE — 85025 COMPLETE CBC W/AUTO DIFF WBC: CPT | Performed by: EMERGENCY MEDICINE

## 2024-08-27 PROCEDURE — 80053 COMPREHEN METABOLIC PANEL: CPT | Performed by: EMERGENCY MEDICINE

## 2024-08-27 PROCEDURE — 84443 ASSAY THYROID STIM HORMONE: CPT | Performed by: EMERGENCY MEDICINE

## 2024-08-27 PROCEDURE — 99285 EMERGENCY DEPT VISIT HI MDM: CPT | Mod: 25

## 2024-08-27 PROCEDURE — 83880 ASSAY OF NATRIURETIC PEPTIDE: CPT | Performed by: EMERGENCY MEDICINE

## 2024-08-27 PROCEDURE — 36415 COLL VENOUS BLD VENIPUNCTURE: CPT | Performed by: EMERGENCY MEDICINE

## 2024-08-27 PROCEDURE — 83735 ASSAY OF MAGNESIUM: CPT | Performed by: EMERGENCY MEDICINE

## 2024-08-27 PROCEDURE — 93005 ELECTROCARDIOGRAM TRACING: CPT

## 2024-08-27 PROCEDURE — 71046 X-RAY EXAM CHEST 2 VIEWS: CPT

## 2024-08-27 PROCEDURE — 84484 ASSAY OF TROPONIN QUANT: CPT | Performed by: EMERGENCY MEDICINE

## 2024-08-27 ASSESSMENT — ACTIVITIES OF DAILY LIVING (ADL): ADLS_ACUITY_SCORE: 35

## 2024-08-28 LAB
ALBUMIN SERPL BCG-MCNC: 3.9 G/DL (ref 3.5–5.2)
ALP SERPL-CCNC: 58 U/L (ref 40–150)
ALT SERPL W P-5'-P-CCNC: 29 U/L (ref 0–70)
ANION GAP SERPL CALCULATED.3IONS-SCNC: 10 MMOL/L (ref 7–15)
AST SERPL W P-5'-P-CCNC: 20 U/L (ref 0–45)
BILIRUB SERPL-MCNC: 0.7 MG/DL
BUN SERPL-MCNC: 12.3 MG/DL (ref 6–20)
CALCIUM SERPL-MCNC: 9.2 MG/DL (ref 8.8–10.4)
CHLORIDE SERPL-SCNC: 102 MMOL/L (ref 98–107)
CREAT SERPL-MCNC: 1.28 MG/DL (ref 0.67–1.17)
EGFRCR SERPLBLD CKD-EPI 2021: 81 ML/MIN/1.73M2
GLUCOSE SERPL-MCNC: 96 MG/DL (ref 70–99)
HCO3 SERPL-SCNC: 27 MMOL/L (ref 22–29)
MAGNESIUM SERPL-MCNC: 2 MG/DL (ref 1.7–2.3)
NT-PROBNP SERPL-MCNC: <36 PG/ML (ref 0–450)
POTASSIUM SERPL-SCNC: 3.9 MMOL/L (ref 3.4–5.3)
PROT SERPL-MCNC: 6.3 G/DL (ref 6.4–8.3)
SODIUM SERPL-SCNC: 139 MMOL/L (ref 135–145)
TROPONIN T SERPL HS-MCNC: 9 NG/L
TSH SERPL DL<=0.005 MIU/L-ACNC: 3.37 UIU/ML (ref 0.3–4.2)

## 2024-08-28 NOTE — ED TRIAGE NOTES
Patient is here with heart palpitations for the past 6 hours, he has chest tightness and shortness of breath.  Iwatch showed highest rate of 130s, he has had this before and was seen in the ER but the heart palpitations went away by the time he got here.  He states that the palpitations come and go.

## 2024-08-28 NOTE — ED PROVIDER NOTES
Emergency Department Note      History of Present Illness     Chief Complaint   Chest Pain and Palpitations      HPI   Shaun Moise is a 24 year old male who presents to the ED with his mother for evaluation of chest pain and palpitations. The patient states he started to experience palpitations, chest tightness, and shortness of breath 6 hours ago that has persisted. States he was sitting on the couch at the time of onset. His heart rate has remained between 70-80 but had one spike up into the 140s during this time. He had one cup of coffee this morning, no other stimulants. His mother states he has a history of irregular heart rhythms and abnormal EKGs as a child which was suspected to be Valorie-Parkinson-White Syndrome by his Cardiologist. He stopped having these episodes for most of his childhood until 2 years ago. Episodes since then have involved chest pain, shortness of breath, and palpitations but last a minute or less and spontaneously resolve each time. States his heart rate remains near baseline at 70-80 BPM during these episodes. He was seen in the ED in 2021 for these symptoms but the episode had passed by the time he was seen and nothing remarkable was found. Reports a family history of DVT/PE and thyroid disease in his grandmother. Patient denies nausea, vomiting, fever, congestion, cough, new medications or medication changes, recent travel, or lower extremity edema or pain. Denies pertinent medical history.     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    Review of External Notes   No    Past Medical History     Medical History and Problem List   No other significant past medical history    Medications   Accutane     Physical Exam     Patient Vitals for the past 24 hrs:   BP Temp Temp src Pulse Resp SpO2 Weight   08/27/24 2253 (!) 142/85 98.2  F (36.8  C) Temporal 80 12 99 % 65.8 kg (145 lb)     Physical  Exam  Eyes:               Sclera white; Pupils are equal and round  ENT:                External ears and nares normal  CV:                  Rate as above with regular rhythm except for occasional PVC on monitor  Resp:               Breath sounds clear and equal bilaterally                          Non-labored, no retractions or accessory muscle use  GI:                   Abdomen is soft, non-tender, non-distended                          No rebound tenderness or peritoneal features  MS:                  Moves all extremities, no ankle swelling/edema  Skin:                Warm and dry  Neuro:             Speech is normal and fluent. No apparent deficit.    Diagnostics     Lab Results   Labs Ordered and Resulted from Time of ED Arrival to Time of ED Departure   COMPREHENSIVE METABOLIC PANEL - Abnormal       Result Value    Sodium 139      Potassium 3.9      Carbon Dioxide (CO2) 27      Anion Gap 10      Urea Nitrogen 12.3      Creatinine 1.28 (*)     GFR Estimate 81      Calcium 9.2      Chloride 102      Glucose 96      Alkaline Phosphatase 58      AST 20      ALT 29      Protein Total 6.3 (*)     Albumin 3.9      Bilirubin Total 0.7     MAGNESIUM - Normal    Magnesium 2.0     TROPONIN T, HIGH SENSITIVITY - Normal    Troponin T, High Sensitivity 9     TSH WITH FREE T4 REFLEX - Normal    TSH 3.37     NT PROBNP INPATIENT - Normal    N terminal Pro BNP Inpatient <36     CBC WITH PLATELETS AND DIFFERENTIAL    WBC Count 6.2      RBC Count 5.03      Hemoglobin 14.6      Hematocrit 42.1      MCV 84      MCH 29.0      MCHC 34.7      RDW 12.4      Platelet Count 230      % Neutrophils 53      % Lymphocytes 36      % Monocytes 9      % Eosinophils 1      % Basophils 1      % Immature Granulocytes 0      NRBCs per 100 WBC 0      Absolute Neutrophils 3.3      Absolute Lymphocytes 2.2      Absolute Monocytes 0.6      Absolute Eosinophils 0.1      Absolute Basophils 0.1      Absolute Immature Granulocytes 0.0      Absolute NRBCs  0.0         Imaging   Chest XR,  PA & LAT   Final Result   IMPRESSION: Normal heart size and pulmonary vascularity. Lungs clear. No pneumothorax or pleural fluid. No overt osseous abnormality.          EKG   ECG taken at 2252, ECG read at 2300  Sinus rhythm with premature supraventricular complexes  Right atrial enlargement  Rightward axis   PVC is new as compared to prior, dated 12/22/21.  Rate 88 bpm. AK interval 128 ms. QRS duration 94 ms. QT/QTc 328/396 ms. P-R-T axes 79 95 33.    Independent Interpretation   CXR - no effusion, pneumothorax, or pneumonia     ED Course      Medications Administered   Medications - No data to display    Procedures   Procedures     Discussion of Management   None    ED Course   ED Course as of 08/28/24 0038   Tue Aug 27, 2024   2259 I obtained history and performed a physical exam as noted above.    Wed Aug 28, 2024   0031 I rechecked and updated the patient.        Additional Documentation  None    Medical Decision Making / Diagnosis     CMS Diagnoses: None    MIPS       None    MDM   EKG w/o ischemia, dysrhythmia, pericarditis, and pre-excitation.  PVCs can be symptomatic but more commonly as a sensatsion of skipped beats or pauses.  Labs show no contributing severe electrolyte disturbances, acute kidney insufficiency, anemia, or suggestion of infection.  Takes creatine which is likely causing some elevation in his serum creatinine level. Recommended Ziopatch and follow-up with PCP afterwards.  No PCP visits in past 3+ years.  Referral placed.    Disposition   The patient was discharged.     Diagnosis     ICD-10-CM    1. Palpitations  R00.2 ZIO PATCH MAIL OUT     Primary Care Referral      2. PVC's (premature ventricular contractions)  I49.3 ZIO PATCH MAIL OUT     Primary Care Referral           Discharge Medications   Discharge Medication List as of 8/28/2024 12:42 AM            Scribe Disclosure:  Imelda FAN, veronique serving as a scribe at 10:55 PM on 8/27/2024 to document  services personally performed by Aleida Aguilar MD based on my observations and the provider's statements to me.        Aleida Aguilar MD  08/30/24 [id number]

## 2024-08-29 ENCOUNTER — ORDERS ONLY (AUTO-RELEASED) (OUTPATIENT)
Dept: EMERGENCY MEDICINE | Facility: CLINIC | Age: 24
End: 2024-08-29
Payer: OTHER GOVERNMENT

## 2024-08-29 DIAGNOSIS — R00.2 PALPITATIONS: ICD-10-CM

## 2024-08-29 DIAGNOSIS — I49.3 PVC'S (PREMATURE VENTRICULAR CONTRACTIONS): ICD-10-CM

## 2024-08-29 LAB
ATRIAL RATE - MUSE: 88 BPM
DIASTOLIC BLOOD PRESSURE - MUSE: NORMAL MMHG
INTERPRETATION ECG - MUSE: NORMAL
P AXIS - MUSE: 79 DEGREES
PR INTERVAL - MUSE: 128 MS
QRS DURATION - MUSE: 94 MS
QT - MUSE: 328 MS
QTC - MUSE: 396 MS
R AXIS - MUSE: 95 DEGREES
SYSTOLIC BLOOD PRESSURE - MUSE: NORMAL MMHG
T AXIS - MUSE: 33 DEGREES
VENTRICULAR RATE- MUSE: 88 BPM

## 2024-09-11 ENCOUNTER — OFFICE VISIT (OUTPATIENT)
Dept: FAMILY MEDICINE | Facility: CLINIC | Age: 24
End: 2024-09-11
Payer: OTHER GOVERNMENT

## 2024-09-11 VITALS
HEART RATE: 86 BPM | SYSTOLIC BLOOD PRESSURE: 118 MMHG | TEMPERATURE: 99 F | BODY MASS INDEX: 19.61 KG/M2 | OXYGEN SATURATION: 97 % | DIASTOLIC BLOOD PRESSURE: 82 MMHG | WEIGHT: 148 LBS | RESPIRATION RATE: 16 BRPM | HEIGHT: 73 IN

## 2024-09-11 DIAGNOSIS — R00.2 PALPITATIONS: ICD-10-CM

## 2024-09-11 DIAGNOSIS — I49.3 PVC'S (PREMATURE VENTRICULAR CONTRACTIONS): ICD-10-CM

## 2024-09-11 PROCEDURE — 99203 OFFICE O/P NEW LOW 30 MIN: CPT | Performed by: INTERNAL MEDICINE

## 2024-09-11 PROCEDURE — 93000 ELECTROCARDIOGRAM COMPLETE: CPT | Performed by: INTERNAL MEDICINE

## 2024-09-11 ASSESSMENT — PAIN SCALES - GENERAL: PAINLEVEL: NO PAIN (0)

## 2024-09-11 NOTE — PROGRESS NOTES
Assessment & Plan   Problem List Items Addressed This Visit    None  Visit Diagnoses       Palpitations        Relevant Orders    Echocardiogram Complete    EKG 12-lead complete w/read - Clinics (Completed)    PVC's (premature ventricular contractions)        Relevant Orders    Echocardiogram Complete    EKG 12-lead complete w/read - Clinics (Completed)           Reassurance given continue monitor.  Schedule echo.  Get family history of any heart disease.  Decrease creatinine supplements.  Keep hydrated.  He does not drink much coffee or alcohol or take any other supplements.  Encouraged him to continue with exercise activities and reassurance given     MED REC REQUIRED  Post Medication Reconciliation Status: discharge medications reconciled, continue medications without change  Work on weight loss  Regular exercise  See Patient Instructions      Subjective   Shaun is a 24 year old, presenting for the following health issues:  ER F/U    HPI       ED/UC Followup:    Facility:   ER  Date of visit: 08/27 - 8/28  Reason for visit: Chest Pain - palpitations  Current Status: better    Patient presenting for hospital follow-up.  He had 1 day episodes of palpitations long-lasting that triggered his ER visit.  Workup in the ER has been nonrevealing Holter monitor results are pending.  He usually gets like short lasting for 1 to 2 minutes of palpitations but this time it was a 1 day symptoms that triggered his visit.  On the weekend Friday to Sunday had some cold symptoms that included he uses [employer] supplements during the gym.  His creatinine was slightly increased on the labs.  No known thyroid disease, not much known about family history    Review of Systems  Constitutional, HEENT, cardiovascular, pulmonary, gi and gu systems are negative, except as otherwise noted.      Objective    /82 (BP Location: Left arm, Patient Position: Sitting, Cuff Size: Adult Regular)   Pulse 86   Temp 99  F (37.2  C)   Resp  "16   Ht 1.854 m (6' 1\")   Wt 67.1 kg (148 lb)   SpO2 97%   BMI 19.53 kg/m    Body mass index is 19.53 kg/m .  Physical Exam   GENERAL: alert and no distress  EYES: Eyes grossly normal to inspection, PERRL and conjunctivae and sclerae normal  HENT: ear canals and TM's normal, nose and mouth without ulcers or lesions  NECK: no adenopathy, no asymmetry, masses, or scars  RESP: lungs clear to auscultation - no rales, rhonchi or wheezes  CV: regular rate and rhythm, normal S1 S2, no S3 or S4, no murmur, click or rub, no peripheral edema  Chest: Pectus deformity.  ABDOMEN: soft, nontender, no hepatosplenomegaly, no masses and bowel sounds normal  MS: no gross musculoskeletal defects noted, no edema  SKIN: no suspicious lesions or rashes  NEURO: Normal strength and tone, mentation intact and speech normal  PSYCH: mentation appears normal, affect normal/bright    Admission on 08/27/2024, Discharged on 08/28/2024   Component Date Value Ref Range Status    Ventricular Rate 08/27/2024 88  BPM Corrected    Atrial Rate 08/27/2024 88  BPM Corrected    DE Interval 08/27/2024 128  ms Corrected    QRS Duration 08/27/2024 94  ms Corrected    QT 08/27/2024 328  ms Corrected    QTc 08/27/2024 396  ms Corrected    P Axis 08/27/2024 79  degrees Corrected    R AXIS 08/27/2024 95  degrees Corrected    T Axis 08/27/2024 33  degrees Corrected    Interpretation ECG 08/27/2024    Corrected                    Value:Sinus rhythm with Premature supraventricular complexes  Right atrial enlargement  Rightward axis  Borderline ECG  When compared with ECG of 22-Dec-2021 21:29,  Premature supraventricular complexes are now Present  Confirmed by GENERATED REPORT, COMPUTER ([id number]),  DAISY HUBBARD ([id number]) on 8/28/2024 5:46:55 PM  Also confirmed by GENERATED REPORT, COMPUTER ([id number]),  Bessie Moreno ([id number])  on 8/29/2024 4:07:29 AM      Sodium 08/27/2024 139  135 - 145 mmol/L Final    Potassium 08/27/2024 3.9  3.4 - 5.3 mmol/L Final    Carbon " Dioxide (CO2) 08/27/2024 27  22 - 29 mmol/L Final    Anion Gap 08/27/2024 10  7 - 15 mmol/L Final    Urea Nitrogen 08/27/2024 12.3  6.0 - 20.0 mg/dL Final    Creatinine 08/27/2024 1.28 (H)  0.67 - 1.17 mg/dL Final    GFR Estimate 08/27/2024 81  >60 mL/min/1.73m2 Final    eGFR calculated using 2021 CKD-EPI equation.    Calcium 08/27/2024 9.2  8.8 - 10.4 mg/dL Final    Reference intervals for this test were updated on 7/16/2024 to reflect our healthy population more accurately. There may be differences in the flagging of prior results with similar values performed with this method. Those prior results can be interpreted in the context of the updated reference intervals.    Chloride 08/27/2024 102  98 - 107 mmol/L Final    Glucose 08/27/2024 96  70 - 99 mg/dL Final    Alkaline Phosphatase 08/27/2024 58  40 - 150 U/L Final    AST 08/27/2024 20  0 - 45 U/L Final    ALT 08/27/2024 29  0 - 70 U/L Final    Protein Total 08/27/2024 6.3 (L)  6.4 - 8.3 g/dL Final    Albumin 08/27/2024 3.9  3.5 - 5.2 g/dL Final    Bilirubin Total 08/27/2024 0.7  <=1.2 mg/dL Final    Magnesium 08/27/2024 2.0  1.7 - 2.3 mg/dL Final    Troponin T, High Sensitivity 08/27/2024 9  <=22 ng/L Final    Either a High Sensitivity Troponin T baseline (0 hours) value = 100 ng/L, or an increase in High Sensitivity Troponin T = 7 ng/L at 2 hours compared to 0 hours (2-0 hours), suggests myocardial injury, and urgent clinical attention is required.    If the 2-0 hours increase is <7 ng/L, a High Sensitivity Troponin T result above gender-specific reference ranges warrants further evaluation.   Recommendations for further evaluation include correlation with clinical decision-making tool (e.g., HEART), a 3rd High Sensitivity Troponin T test 2 hours after the 2nd (a 20% change from baseline would represent concern), admission for observation, close PCC/cardiology follow-up, or urgent outpatient provocative testing.    TSH 08/27/2024 3.37  0.30 - 4.20 uIU/mL Final     N terminal Pro BNP Inpatient 08/27/2024 <36  0 - 450 pg/mL Final    Reference range shown and results flagged as abnormal are suggested inpatient cut points for confirming diagnosis if CHF in an acute setting. Establishing a baseline value for each individual patient is useful for follow-up. An inpatient or emergency department NT-proPBNP <300 pg/mL effectively rules out acute CHF, with 99% negative predictive value.    The outpatient non-acute reference range for ruling out CHF is:  0-125 pg/mL (age 18 to less than 75)  0-450 pg/mL (age 75 yrs and older)     WBC Count 08/27/2024 6.2  4.0 - 11.0 10e3/uL Final    RBC Count 08/27/2024 5.03  4.40 - 5.90 10e6/uL Final    Hemoglobin 08/27/2024 14.6  13.3 - 17.7 g/dL Final    Hematocrit 08/27/2024 42.1  40.0 - 53.0 % Final    MCV 08/27/2024 84  78 - 100 fL Final    MCH 08/27/2024 29.0  26.5 - 33.0 pg Final    MCHC 08/27/2024 34.7  31.5 - 36.5 g/dL Final    RDW 08/27/2024 12.4  10.0 - 15.0 % Final    Platelet Count 08/27/2024 230  150 - 450 10e3/uL Final    % Neutrophils 08/27/2024 53  % Final    % Lymphocytes 08/27/2024 36  % Final    % Monocytes 08/27/2024 9  % Final    % Eosinophils 08/27/2024 1  % Final    % Basophils 08/27/2024 1  % Final    % Immature Granulocytes 08/27/2024 0  % Final    NRBCs per 100 WBC 08/27/2024 0  <1 /100 Final    Absolute Neutrophils 08/27/2024 3.3  1.6 - 8.3 10e3/uL Final    Absolute Lymphocytes 08/27/2024 2.2  0.8 - 5.3 10e3/uL Final    Absolute Monocytes 08/27/2024 0.6  0.0 - 1.3 10e3/uL Final    Absolute Eosinophils 08/27/2024 0.1  0.0 - 0.7 10e3/uL Final    Absolute Basophils 08/27/2024 0.1  0.0 - 0.2 10e3/uL Final    Absolute Immature Granulocytes 08/27/2024 0.0  <=0.4 10e3/uL Final    Absolute NRBCs 08/27/2024 0.0  10e3/uL Final           Signed Electronically by: Manish Gay MD

## 2024-09-20 PROCEDURE — 93244 EXT ECG>48HR<7D REV&INTERPJ: CPT | Performed by: INTERNAL MEDICINE

## 2025-07-13 ENCOUNTER — HEALTH MAINTENANCE LETTER (OUTPATIENT)
Age: 25
End: 2025-07-13